# Patient Record
Sex: MALE | Race: WHITE
[De-identification: names, ages, dates, MRNs, and addresses within clinical notes are randomized per-mention and may not be internally consistent; named-entity substitution may affect disease eponyms.]

---

## 2020-07-24 ENCOUNTER — HOSPITAL ENCOUNTER (EMERGENCY)
Dept: HOSPITAL 94 - ER | Age: 30
Discharge: HOME | End: 2020-07-24
Payer: MEDICAID

## 2020-07-24 VITALS — HEIGHT: 74 IN | WEIGHT: 158.73 LBS | BODY MASS INDEX: 20.37 KG/M2

## 2020-07-24 VITALS — SYSTOLIC BLOOD PRESSURE: 114 MMHG | DIASTOLIC BLOOD PRESSURE: 76 MMHG

## 2020-07-24 DIAGNOSIS — F41.9: Primary | ICD-10-CM

## 2020-07-24 DIAGNOSIS — F11.90: ICD-10-CM

## 2020-07-24 DIAGNOSIS — R11.0: ICD-10-CM

## 2020-07-24 DIAGNOSIS — F19.10: ICD-10-CM

## 2020-07-24 DIAGNOSIS — F15.90: ICD-10-CM

## 2020-07-24 PROCEDURE — 99283 EMERGENCY DEPT VISIT LOW MDM: CPT

## 2020-07-24 PROCEDURE — 99285 EMERGENCY DEPT VISIT HI MDM: CPT

## 2020-07-24 NOTE — NUR
PT'S GIRLFRIEND AWARE PT IS BEING DC'D AND WILL BE WAITING OUTSIDE OF [facility]

## 2020-07-24 NOTE — NUR
PT CONTINUES TO REST QUIETLY ON GURNEY, [place] IS [place] OUT [place], GIRLFRIEND IS 
DRIVING FROM [place] TO PICK PT UP

## 2020-07-24 NOTE — NUR
PT AMB WITH STEADY GAIT TO RESTROOM, PT IS CRYING, "I WANT TO STAY HERE UNTIL 
MY GIRLFRIEND COMES", DR SOTO AT BEDSIDE TO EVAL PT